# Patient Record
Sex: MALE | Race: OTHER | NOT HISPANIC OR LATINO
[De-identification: names, ages, dates, MRNs, and addresses within clinical notes are randomized per-mention and may not be internally consistent; named-entity substitution may affect disease eponyms.]

---

## 2019-12-11 ENCOUNTER — FORM ENCOUNTER (OUTPATIENT)
Age: 39
End: 2019-12-11

## 2019-12-12 ENCOUNTER — OUTPATIENT (OUTPATIENT)
Dept: OUTPATIENT SERVICES | Facility: HOSPITAL | Age: 39
LOS: 1 days | End: 2019-12-12
Payer: COMMERCIAL

## 2019-12-12 ENCOUNTER — APPOINTMENT (OUTPATIENT)
Dept: ORTHOPEDIC SURGERY | Facility: CLINIC | Age: 39
End: 2019-12-12
Payer: MEDICAID

## 2019-12-12 VITALS
HEART RATE: 77 BPM | BODY MASS INDEX: 20.09 KG/M2 | HEIGHT: 66 IN | OXYGEN SATURATION: 98 % | WEIGHT: 125 LBS | SYSTOLIC BLOOD PRESSURE: 120 MMHG | DIASTOLIC BLOOD PRESSURE: 66 MMHG

## 2019-12-12 DIAGNOSIS — G57.91 UNSPECIFIED MONONEUROPATHY OF RIGHT LOWER LIMB: ICD-10-CM

## 2019-12-12 DIAGNOSIS — M22.2X1 PATELLOFEMORAL DISORDERS, RIGHT KNEE: ICD-10-CM

## 2019-12-12 PROBLEM — Z00.00 ENCOUNTER FOR PREVENTIVE HEALTH EXAMINATION: Status: ACTIVE | Noted: 2019-12-12

## 2019-12-12 PROCEDURE — 73564 X-RAY EXAM KNEE 4 OR MORE: CPT

## 2019-12-12 PROCEDURE — 72110 X-RAY EXAM L-2 SPINE 4/>VWS: CPT | Mod: 26

## 2019-12-12 PROCEDURE — 73610 X-RAY EXAM OF ANKLE: CPT | Mod: 26,RT

## 2019-12-12 PROCEDURE — 73590 X-RAY EXAM OF LOWER LEG: CPT

## 2019-12-12 PROCEDURE — 99204 OFFICE O/P NEW MOD 45 MIN: CPT

## 2019-12-12 PROCEDURE — 72110 X-RAY EXAM L-2 SPINE 4/>VWS: CPT

## 2019-12-12 PROCEDURE — 73590 X-RAY EXAM OF LOWER LEG: CPT | Mod: 26,RT

## 2019-12-12 PROCEDURE — 73564 X-RAY EXAM KNEE 4 OR MORE: CPT | Mod: 26,RT

## 2019-12-12 PROCEDURE — 73610 X-RAY EXAM OF ANKLE: CPT

## 2019-12-12 NOTE — DISCUSSION/SUMMARY
[de-identified] : The patient is a 39 year old, right hand dominant man who presents with a two week history of mild right-sided low back pain with right lateral leg numbness.  His symptoms seem to be in the L5-S1 distribution, central vs. peripheral.  \par \par He also complains of chronic anterior right knee pain.  His distal symptoms are less likely related to structural knee issues.  He likely has mild patellofemoral pain.\par \par Imaging was reviewed with the patient in detail.  All questions were answered appropriately.\par \par MRI of the lumbosacral spine ordered today to rule out radiculopathy.\par \par Patient was given a referral to Neurology, Dr. Macias.\par \par He was given a list of home exercise for PF syndrome.\par \par Follow-up after MRI, or if able to see Dr. Macias sooner.\par \par The patient is reluctant to drive as he has been having lower extremity numbness and spasm.  He was provided a work note with restrictions today.\par \par Patient appreciates and agrees with current plan.\par \par This note was generated using dragon medical dictation software.  A reasonable effort has been made for proofreading its contents, but typos may still remain.  If there are any questions or points of clarification needed please notify my office.\par \par

## 2019-12-12 NOTE — PHYSICAL EXAM
[de-identified] : General: Well-nourished, well-developed, alert, and in no acute distress.\par Head: Normocephalic.\par Eyes: Pupils equal round reactive to light and accommodation, extraocular muscles intact, normal sclera.\par Nose: No nasal discharge.\par Cardiac: Regular rate. Extremities are warm and well perfused. Distal pulses are symmetric bilaterally.\par Respiratory: No labored breathing.\par Extremities: Sensation is intact distally bilaterally.  Distal pulses are symmetric bilaterally\par Neurologic: No focal deficits\par Skin: Normal skin color, texture, and turgor\par Psychiatric: Normal affect\par \par RIGHT KNEE:\par \par Inspection: no bruising, swelling, erythema\par Joint Effusion:no \par ROM: Knee Flexion 130-140 , Knee Extension 0\par Palpation:MILD PATELLAR FACET TENDERNESS, No pain at joint line, patellar tendon, MFC/LFC, Medial/Lateral Tibial Plateau\par Leg Length Discrepancy:no\par Patella: no apprehension\par Distal Pulses: normal\par Lower Extremity Strength:5/5 KNEE EXTENSION/FLEXION\par Lower Extremity Reflexes:normal, 2+\par Lower Extremity Sensation: normal\par \par Special Tests:\par Mireya:Negative \par Govind: Negative\par Anterior Drawer:Negative\par Anterior Lachman:Negative\par Posterior Drawer:Negative \par Varus/Valgus:Negative, no instability\par \par LEFT KNEE:\par \par Inspection: no bruising, swelling, erythema\par Joint Effusion:no \par ROM: Knee Flexion 130-140 , Knee Extension 0\par Palpation:No pain at joint line, patellar tendon, MFC/LFC, Medial/Lateral Tibial Plateau\par Leg Length Discrepancy:no\par Patella: no apprehension\par Distal Pulses: normal\par Lower Extremity Strength:normal, 5/5 \par Lower Extremity Reflexes:normal, 2+\par Lower Extremity Sensation: normal\par \par Special Tests:\par [facility]:Negative \par Govind: Negative\par Anterior Drawer:Negative\par Anterior Lachman:Negative\par Posterior Drawer:Negative \par Varus/Valgus:Negative, no instability\par \par RIGHT LEG/ANKLE:\par \par Inspection: no swelling, ecchymosis, gross deformity\par Palpation: PAIN AT ANTERIOR ANKLE MORTISE,  NO pain at ATFL, CFL,deltoid ligament, joint line pain, lateral malleolus pain, medial malleolus pain, achilles pain,  fibular head pain, 5th metatarsal pain, tarsal or distal phalanx pain\par ROM: normal ankle dorsiflexion, ankle plantarflexion, eversion, inversion\par Strength: 4/5 PLANTARFLEXION, 5/5 EVERSION, 5/5 DORSIFLEXION, 5/5 INVERSION\par Neurovascular: 2+ pulses distally\par Sensation: normal\par \par Special Tests: \par Anterior Drawer: Negative\par Talar Tilt: Negative\par Lower Extremity Squeeze/Compression: Negative\par External Rotation at Tib-Fib Syndesmosis: Negative\par Murguia Test: Negative\par Gait: Normal Reciprocal Gait\par \par LEFT ANKLE:\par \par Inspection: no swelling, ecchymosis, gross deformity\par Palpation: NO pain at ATFL, CFL,deltoid ligament, joint line pain, lateral malleolus pain, medial malleolus pain, achilles pain,  fibular head pain, 5th metatarsal pain, tarsal or distal phalanx pain\par ROM: normal ankle dorsiflexion, ankle plantarflexion, eversion, inversion\par Strength: 5/5\par Neurovascular: 2+ pulses distally\par Sensation: normal\par \par Special Tests: \par Anterior Drawer: Negative\par Talar Tilt: Negative\par Lower Extremity Squeeze/Compression: Negative\par External Rotation at Tib-Fib Syndesmosis: Negative\par Murguia Test: Negative\par Gait: Normal Reciprocal Gait\par \par Low Back:\par \par Inspection:\par ·	Alignment: No scoliosis or deformity. \par ·	Spasm not noted. \par ·	No scars\par \par Palpation:   \par ·	SI  Joints:  NO pain\par ·	Iliolumbar ligaments    MILD RIGHT-SIDED pain\par ·	Quadratus lumborum   MILD RIGHT-SIDED pain\par ·	Interspinous ligament  NO pain\par ·	Greater trochanter  NO pain\par ·	Mid thoracic spine: NO  pain\par \par \par ROM: \par ·	 Flexion:  70 degrees, without pain.\par ·	Extension:  5-10 degrees WITH MILD pain  \par ·	Side Bending:  Full, without pain \par \par \par Strength: \par ·	Core:  Trendelenburg: FAIR-GOOD\par ·	Hip / Leg Flexion, Extension  5/5\par ·	Foot Eversion/ Inversion:   4/5 EVERSION ON RIGHT COMPARED TO 5/5 ON LEFT, 5/5 INVERION\par ·	Calf:   4/5 PLANTARFLEXION ON RIGHT, 5/5 ON LEFT\par ·	EHL:  5/5\par \par NEURO  \par ·	Sensation:   Intact throughout the lower extremity\par ·	DTR's:  Symmetric throughout the lower extremity.  \par \par Other tests: \par ·	Slump test: NEGATIVE\par ·	Straight leg raise test:   NEGATIVE\par ·	Stork: NEGATIVE\par Vascular:  \par ·	No cyanosis, clubbing, edema.  \par ·	Palpable dorsalis pedis.\par \par \par  [de-identified] : Xray Right Knees - Multiple views were reviewed with the patient in detail.  There is no acute fracture or dislocation.  There is no joint effusion.  Joint spaces are preserved.  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  There is no acute fracture. We will await formal radiology reading.\par \par Xray Right Ankle - Multiple views were reviewed with the patient in detail.  There is no acute fracture or dislocation.  There is no joint effusion.  Joint spaces are preserved.\par \par Xray Lumbosacral Spine - Multiple views were reviewed with the patient in detail.  There is normal curvature.  There is no acute fracture.  There does not appear to be instability with flexion/extension.  Appears to be some disc space narrowing at L5-S1. We will await formal radiology reading.\par \par \par \par \par \par